# Patient Record
Sex: MALE | Race: OTHER | HISPANIC OR LATINO
[De-identification: names, ages, dates, MRNs, and addresses within clinical notes are randomized per-mention and may not be internally consistent; named-entity substitution may affect disease eponyms.]

---

## 2018-02-01 PROBLEM — Z00.00 ENCOUNTER FOR PREVENTIVE HEALTH EXAMINATION: Status: ACTIVE | Noted: 2018-02-01

## 2018-02-07 ENCOUNTER — APPOINTMENT (OUTPATIENT)
Dept: ORTHOPEDIC SURGERY | Facility: CLINIC | Age: 38
End: 2018-02-07
Payer: COMMERCIAL

## 2018-02-07 VITALS — WEIGHT: 200 LBS | HEIGHT: 69 IN | BODY MASS INDEX: 29.62 KG/M2

## 2018-02-07 DIAGNOSIS — Z86.79 PERSONAL HISTORY OF OTHER DISEASES OF THE CIRCULATORY SYSTEM: ICD-10-CM

## 2018-02-07 DIAGNOSIS — M25.561 PAIN IN RIGHT KNEE: ICD-10-CM

## 2018-02-07 DIAGNOSIS — M17.10 UNILATERAL PRIMARY OSTEOARTHRITIS, UNSPECIFIED KNEE: ICD-10-CM

## 2018-02-07 DIAGNOSIS — Z87.891 PERSONAL HISTORY OF NICOTINE DEPENDENCE: ICD-10-CM

## 2018-02-07 DIAGNOSIS — C80.1 MALIGNANT (PRIMARY) NEOPLASM, UNSPECIFIED: ICD-10-CM

## 2018-02-07 DIAGNOSIS — M25.562 PAIN IN RIGHT KNEE: ICD-10-CM

## 2018-02-07 DIAGNOSIS — Z80.9 FAMILY HISTORY OF MALIGNANT NEOPLASM, UNSPECIFIED: ICD-10-CM

## 2018-02-07 DIAGNOSIS — Z87.09 PERSONAL HISTORY OF OTHER DISEASES OF THE RESPIRATORY SYSTEM: ICD-10-CM

## 2018-02-07 PROCEDURE — 73560 X-RAY EXAM OF KNEE 1 OR 2: CPT | Mod: 50

## 2018-02-07 PROCEDURE — 99203 OFFICE O/P NEW LOW 30 MIN: CPT

## 2018-02-21 ENCOUNTER — TRANSCRIPTION ENCOUNTER (OUTPATIENT)
Age: 38
End: 2018-02-21

## 2018-03-05 ENCOUNTER — APPOINTMENT (OUTPATIENT)
Dept: ORTHOPEDIC SURGERY | Facility: CLINIC | Age: 38
End: 2018-03-05
Payer: COMMERCIAL

## 2018-03-05 DIAGNOSIS — M94.261 CHONDROMALACIA, RIGHT KNEE: ICD-10-CM

## 2018-03-05 DIAGNOSIS — M17.12 UNILATERAL PRIMARY OSTEOARTHRITIS, LEFT KNEE: ICD-10-CM

## 2018-03-05 DIAGNOSIS — S83.512S SPRAIN OF ANTERIOR CRUCIATE LIGAMENT OF LEFT KNEE, SEQUELA: ICD-10-CM

## 2018-03-05 PROCEDURE — 99213 OFFICE O/P EST LOW 20 MIN: CPT

## 2020-05-13 ENCOUNTER — APPOINTMENT (OUTPATIENT)
Dept: HUMAN REPRODUCTION | Facility: CLINIC | Age: 40
End: 2020-05-13

## 2020-05-21 ENCOUNTER — APPOINTMENT (OUTPATIENT)
Dept: HUMAN REPRODUCTION | Facility: CLINIC | Age: 40
End: 2020-05-21
Payer: COMMERCIAL

## 2020-05-21 PROCEDURE — 89322 SEMEN ANAL STRICT CRITERIA: CPT

## 2020-05-29 ENCOUNTER — APPOINTMENT (OUTPATIENT)
Dept: HUMAN REPRODUCTION | Facility: CLINIC | Age: 40
End: 2020-05-29

## 2020-08-18 ENCOUNTER — TRANSCRIPTION ENCOUNTER (OUTPATIENT)
Age: 40
End: 2020-08-18

## 2020-12-03 ENCOUNTER — TRANSCRIPTION ENCOUNTER (OUTPATIENT)
Age: 40
End: 2020-12-03

## 2021-05-11 ENCOUNTER — NON-APPOINTMENT (OUTPATIENT)
Age: 41
End: 2021-05-11

## 2021-05-11 ENCOUNTER — APPOINTMENT (OUTPATIENT)
Dept: INTERNAL MEDICINE | Facility: CLINIC | Age: 41
End: 2021-05-11
Payer: COMMERCIAL

## 2021-05-11 VITALS
HEART RATE: 84 BPM | WEIGHT: 187 LBS | OXYGEN SATURATION: 96 % | DIASTOLIC BLOOD PRESSURE: 84 MMHG | SYSTOLIC BLOOD PRESSURE: 131 MMHG | HEIGHT: 67 IN | TEMPERATURE: 98 F | BODY MASS INDEX: 29.35 KG/M2

## 2021-05-11 VITALS — DIASTOLIC BLOOD PRESSURE: 82 MMHG | SYSTOLIC BLOOD PRESSURE: 130 MMHG

## 2021-05-11 DIAGNOSIS — Z87.01 PERSONAL HISTORY OF PNEUMONIA (RECURRENT): ICD-10-CM

## 2021-05-11 DIAGNOSIS — Z23 ENCOUNTER FOR IMMUNIZATION: ICD-10-CM

## 2021-05-11 DIAGNOSIS — R42 DIZZINESS AND GIDDINESS: ICD-10-CM

## 2021-05-11 DIAGNOSIS — Z83.438 FAMILY HISTORY OF OTHER DISORDER OF LIPOPROTEIN METABOLISM AND OTHER LIPIDEMIA: ICD-10-CM

## 2021-05-11 DIAGNOSIS — Z78.9 OTHER SPECIFIED HEALTH STATUS: ICD-10-CM

## 2021-05-11 DIAGNOSIS — R07.89 OTHER CHEST PAIN: ICD-10-CM

## 2021-05-11 DIAGNOSIS — Z82.49 FAMILY HISTORY OF ISCHEMIC HEART DISEASE AND OTHER DISEASES OF THE CIRCULATORY SYSTEM: ICD-10-CM

## 2021-05-11 PROCEDURE — 93000 ELECTROCARDIOGRAM COMPLETE: CPT

## 2021-05-11 PROCEDURE — 99395 PREV VISIT EST AGE 18-39: CPT | Mod: 25

## 2021-05-11 PROCEDURE — 99072 ADDL SUPL MATRL&STAF TM PHE: CPT

## 2021-05-11 RX ORDER — CHLORHEXIDINE GLUCONATE 4 %
LIQUID (ML) TOPICAL
Qty: 90 | Refills: 1 | Status: ACTIVE | COMMUNITY
Start: 2021-05-11

## 2021-05-11 NOTE — DATA REVIEWED
[FreeTextEntry1] : EKG - NSR, R - 70, axis - 15,  no STTW abnormality.  Prior EKG not available for comparison.

## 2021-05-11 NOTE — ASSESSMENT
[FreeTextEntry1] : Pt was reminded to have routine eye exam and dental care.  I will check PSA level, but pt advised to start screening colonoscopy at age 46, unless he has any symptoms.  I gave him Rx to check routine labs.

## 2021-05-11 NOTE — HISTORY OF PRESENT ILLNESS
[FreeTextEntry1] : Patient presented for the first time for transition of care, she's looking for a new PCP and requesting a PE.   Last PE and labs 11/2019. [de-identified] : Pt c/o balance issues, especially at work.  He has similar issues about 3 years ago, and it got better.  He was better, but the symptoms came back again.  He noticed it over the past few months.  He has it mostly when he's standing still, and he turns his head and feel wobbly, but did not fall.  The whole episode last only a few seconds.  He's able to catch himself and not fall.  No nausea, HA, change of vision, tinnitus.  He never gets them at night or when he's driving.\par \par A few years, he had MRI of brain and was told it was ok.  Then pt saw neurologist, and exam was Ok.  Pt was told to have more salt in his diet, especially with his diet. \par \par Pt has nasal congestion and sneezing for a couple of months, usually in the spring.  He doesn't take meds for it.  R nostril is congested, with only crusty or clear discharge.\par \par He's a , and he wears a vest, and he has some pressure and pain on L lower chest on & off for a year.  It's annoying, and sort of throbbing.  It feels like he pulls a muscle, the discomfort is about 4/10.  It goes away on its own.  \par \par Pt also wanted to know if he should have cancer screening.\par \par Pt did not get flu vaccine, and did not get COVID vaccine yet.  Pt was well and did not get sick throughout the COVID pandemic.

## 2021-05-11 NOTE — HEALTH RISK ASSESSMENT
[Very Good] : ~his/her~  mood as very good [Yes] : Yes [Monthly or less (1 pt)] : Monthly or less (1 point) [1 or 2 (0 pts)] : 1 or 2 (0 points) [Never (0 pts)] : Never (0 points) [No] : In the past 12 months have you used drugs other than those required for medical reasons? No [No falls in past year] : Patient reported no falls in the past year [0] : 2) Feeling down, depressed, or hopeless: Not at all (0) [None] : None [With Family] : lives with family [# of Members in Household ___] :  household currently consist of [unfilled] member(s) [Employed] : employed [College] : College [] :  [# Of Children ___] : has [unfilled] children [Feels Safe at Home] : Feels safe at home [Fully functional (bathing, dressing, toileting, transferring, walking, feeding)] : Fully functional (bathing, dressing, toileting, transferring, walking, feeding) [Fully functional (using the telephone, shopping, preparing meals, housekeeping, doing laundry, using] : Fully functional and needs no help or supervision to perform IADLs (using the telephone, shopping, preparing meals, housekeeping, doing laundry, using transportation, managing medications and managing finances) [Smoke Detector] : smoke detector [Carbon Monoxide Detector] : carbon monoxide detector [Seat Belt] :  uses seat belt [] : No [Audit-CScore] : 1 [de-identified] : No formal exercise,  [[id number]] : 0 [EyeExamDate] : 02/21 [Change in mental status noted] : No change in mental status noted [Reports changes in hearing] : Reports no changes in hearing [Reports changes in vision] : Reports no changes in vision [Reports changes in dental health] : Reports no changes in dental health [de-identified] : dentist - 5/2021

## 2021-05-11 NOTE — PHYSICAL EXAM
[No Acute Distress] : no acute distress [Well Nourished] : well nourished [Well Developed] : well developed [Normal Sclera/Conjunctiva] : normal sclera/conjunctiva [PERRL] : pupils equal round and reactive to light [EOMI] : extraocular movements intact [Normal Outer Ear/Nose] : the outer ears and nose were normal in appearance [Normal Oropharynx] : the oropharynx was normal [Normal TMs] : both tympanic membranes were normal [Normal Nasal Mucosa] : the nasal mucosa was normal [No JVD] : no jugular venous distention [No Lymphadenopathy] : no lymphadenopathy [Supple] : supple [No Respiratory Distress] : no respiratory distress  [Clear to Auscultation] : lungs were clear to auscultation bilaterally [Normal Rate] : normal rate  [Regular Rhythm] : with a regular rhythm [Normal S1, S2] : normal S1 and S2 [No Carotid Bruits] : no carotid bruits [No Varicosities] : no varicosities [Pedal Pulses Present] : the pedal pulses are present [No Edema] : there was no peripheral edema [No Extremity Clubbing/Cyanosis] : no extremity clubbing/cyanosis [Normal Appearance] : normal in appearance [No Masses] : no palpable masses [No Nipple Discharge] : no nipple discharge [No Axillary Lymphadenopathy] : no axillary lymphadenopathy [Soft] : abdomen soft [Non Tender] : non-tender [Non-distended] : non-distended [Normal Bowel Sounds] : normal bowel sounds [Normal Supraclavicular Nodes] : no supraclavicular lymphadenopathy [Normal Axillary Nodes] : no axillary lymphadenopathy [Normal Posterior Cervical Nodes] : no posterior cervical lymphadenopathy [Normal Anterior Cervical Nodes] : no anterior cervical lymphadenopathy [No CVA Tenderness] : no CVA  tenderness [No Spinal Tenderness] : no spinal tenderness [No Joint Swelling] : no joint swelling [Grossly Normal Strength/Tone] : grossly normal strength/tone [No Rash] : no rash [Coordination Grossly Intact] : coordination grossly intact [No Focal Deficits] : no focal deficits [Normal Gait] : normal gait [Speech Grossly Normal] : speech grossly normal [Normal Affect] : the affect was normal [Alert and Oriented x3] : oriented to person, place, and time [Normal Mood] : the mood was normal [de-identified] : Overweight male in stated age,  [de-identified] : Chest wall pain not reproducible on exam, [de-identified] : Vertigo was not elicited on exam, and no nystagmus,

## 2021-05-12 LAB
25(OH)D3 SERPL-MCNC: 29.5 NG/ML
ALBUMIN SERPL ELPH-MCNC: 4.8 G/DL
ALP BLD-CCNC: 64 U/L
ALT SERPL-CCNC: 30 U/L
ANION GAP SERPL CALC-SCNC: 14 MMOL/L
APPEARANCE: CLEAR
AST SERPL-CCNC: 19 U/L
BASOPHILS # BLD AUTO: 0.03 K/UL
BASOPHILS NFR BLD AUTO: 0.5 %
BILIRUB SERPL-MCNC: 0.5 MG/DL
BILIRUBIN URINE: NEGATIVE
BLOOD URINE: NEGATIVE
BUN SERPL-MCNC: 10 MG/DL
CALCIUM SERPL-MCNC: 9.7 MG/DL
CHLORIDE SERPL-SCNC: 103 MMOL/L
CHOLEST SERPL-MCNC: 178 MG/DL
CO2 SERPL-SCNC: 25 MMOL/L
COLOR: YELLOW
CREAT SERPL-MCNC: 0.89 MG/DL
EOSINOPHIL # BLD AUTO: 0.25 K/UL
EOSINOPHIL NFR BLD AUTO: 3.9 %
ESTIMATED AVERAGE GLUCOSE: 94 MG/DL
GLUCOSE QUALITATIVE U: NEGATIVE
GLUCOSE SERPL-MCNC: 85 MG/DL
HBA1C MFR BLD HPLC: 4.9 %
HCT VFR BLD CALC: 43.1 %
HDLC SERPL-MCNC: 35 MG/DL
HGB BLD-MCNC: 14 G/DL
IMM GRANULOCYTES NFR BLD AUTO: 0.3 %
KETONES URINE: NEGATIVE
LDLC SERPL CALC-MCNC: 117 MG/DL
LEUKOCYTE ESTERASE URINE: NEGATIVE
LYMPHOCYTES # BLD AUTO: 2.16 K/UL
LYMPHOCYTES NFR BLD AUTO: 34 %
MAN DIFF?: NORMAL
MCHC RBC-ENTMCNC: 30.5 PG
MCHC RBC-ENTMCNC: 32.5 GM/DL
MCV RBC AUTO: 93.9 FL
MONOCYTES # BLD AUTO: 0.53 K/UL
MONOCYTES NFR BLD AUTO: 8.3 %
NEUTROPHILS # BLD AUTO: 3.36 K/UL
NEUTROPHILS NFR BLD AUTO: 53 %
NITRITE URINE: NEGATIVE
NONHDLC SERPL-MCNC: 143 MG/DL
PH URINE: 5.5
PLATELET # BLD AUTO: 292 K/UL
POTASSIUM SERPL-SCNC: 4.3 MMOL/L
PROT SERPL-MCNC: 7.5 G/DL
PROTEIN URINE: NORMAL
PSA SERPL-MCNC: 1.25 NG/ML
RBC # BLD: 4.59 M/UL
RBC # FLD: 11.9 %
SODIUM SERPL-SCNC: 142 MMOL/L
SPECIFIC GRAVITY URINE: 1.02
T4 FREE SERPL-MCNC: 1 NG/DL
TRIGL SERPL-MCNC: 130 MG/DL
TSH SERPL-ACNC: 2.26 UIU/ML
UROBILINOGEN URINE: NORMAL
WBC # FLD AUTO: 6.35 K/UL

## 2021-05-24 ENCOUNTER — APPOINTMENT (OUTPATIENT)
Dept: INTERNAL MEDICINE | Facility: CLINIC | Age: 41
End: 2021-05-24
Payer: COMMERCIAL

## 2021-05-24 VITALS
OXYGEN SATURATION: 97 % | HEART RATE: 82 BPM | HEIGHT: 67.5 IN | SYSTOLIC BLOOD PRESSURE: 110 MMHG | TEMPERATURE: 97.7 F | DIASTOLIC BLOOD PRESSURE: 80 MMHG | BODY MASS INDEX: 29.47 KG/M2 | WEIGHT: 190 LBS

## 2021-05-24 DIAGNOSIS — J30.9 ALLERGIC RHINITIS, UNSPECIFIED: ICD-10-CM

## 2021-05-24 DIAGNOSIS — Z80.0 FAMILY HISTORY OF MALIGNANT NEOPLASM OF DIGESTIVE ORGANS: ICD-10-CM

## 2021-05-24 DIAGNOSIS — R42 DIZZINESS AND GIDDINESS: ICD-10-CM

## 2021-05-24 PROCEDURE — 99215 OFFICE O/P EST HI 40 MIN: CPT

## 2021-05-24 RX ORDER — FLUTICASONE PROPIONATE 50 UG/1
50 SPRAY, METERED NASAL DAILY
Qty: 1 | Refills: 0 | Status: ACTIVE | COMMUNITY
Start: 2021-05-24 | End: 1900-01-01

## 2021-05-24 RX ORDER — MECLIZINE HYDROCHLORIDE 25 MG/1
25 TABLET ORAL 3 TIMES DAILY
Qty: 30 | Refills: 5 | Status: ACTIVE | COMMUNITY
Start: 2021-05-24 | End: 1900-01-01

## 2021-05-24 NOTE — PHYSICAL EXAM
[No Acute Distress] : no acute distress [Well Developed] : well developed [Well Nourished] : well nourished [Normal Sclera/Conjunctiva] : normal sclera/conjunctiva [EOMI] : extraocular movements intact [No Respiratory Distress] : no respiratory distress  [Clear to Auscultation] : lungs were clear to auscultation bilaterally [Normal S1, S2] : normal S1 and S2 [No Murmur] : no murmur heard [No Edema] : there was no peripheral edema [No Extremity Clubbing/Cyanosis] : no extremity clubbing/cyanosis [Soft] : abdomen soft [Non-distended] : non-distended [Normal Bowel Sounds] : normal bowel sounds

## 2021-05-24 NOTE — HEALTH RISK ASSESSMENT
[No falls in past year] : Patient reported no falls in the past year [0] : 2) Feeling down, depressed, or hopeless: Not at all (0) [[id number]] : 0

## 2021-05-24 NOTE — HISTORY OF PRESENT ILLNESS
[FreeTextEntry8] : Mr. KYA BA is a 40 year old man with pmhx of allergic rhinitis who presents to establish care. He recently saw Dr. Lepe with unremarkable labwork and ekg. He endorses recently his mother passed away from gastric cancer and his uncle from colon cancer. He has increased concern about this and wanted to discuss screening options. He endorses balance problem, no dizziness nor vertigo. He had this in the past. \par

## 2021-06-09 ENCOUNTER — APPOINTMENT (OUTPATIENT)
Dept: GASTROENTEROLOGY | Facility: CLINIC | Age: 41
End: 2021-06-09
Payer: COMMERCIAL

## 2021-06-09 VITALS
BODY MASS INDEX: 29.78 KG/M2 | HEIGHT: 67.5 IN | OXYGEN SATURATION: 96 % | DIASTOLIC BLOOD PRESSURE: 80 MMHG | TEMPERATURE: 97.6 F | HEART RATE: 84 BPM | WEIGHT: 192 LBS | SYSTOLIC BLOOD PRESSURE: 116 MMHG

## 2021-06-09 DIAGNOSIS — K62.5 HEMORRHAGE OF ANUS AND RECTUM: ICD-10-CM

## 2021-06-09 DIAGNOSIS — R10.13 EPIGASTRIC PAIN: ICD-10-CM

## 2021-06-09 PROCEDURE — 99204 OFFICE O/P NEW MOD 45 MIN: CPT

## 2021-06-09 NOTE — ASSESSMENT
[FreeTextEntry1] : 1. dyspepsia , family h/o gastric cancer, recommend egd to r/o gastritis, etc.\par \par Discussed risks including but not limited to bleeding,infection,drug reaction, perforation,missed lesion,benefits and alternatives of colonoscopy/egd with patient  including no\par treatment and patient consents to procedure.\par \par plan egd to be scheduled\par \par 2. h/o brbpr, given age close to 41 recommend colonoscopy to r/o hemorrhoids,polyp etc.\par \par plan colonoscopy to be scheduled\par \par 3. left sided rib pain, \par \par plan management per pcp\par

## 2021-06-09 NOTE — PHYSICAL EXAM
[General Appearance - Alert] : alert [General Appearance - In No Acute Distress] : in no acute distress [General Appearance - Well Nourished] : well nourished [General Appearance - Well Developed] : well developed [Sclera] : the sclera and conjunctiva were normal [Hearing Threshold Finger Rub Not [place]] : hearing was normal [Respiration, Rhythm And Depth] : normal respiratory rhythm and effort [Auscultation Breath Sounds / Voice Sounds] : lungs were clear to auscultation bilaterally [Heart Sounds] : normal S1 and S2 [Bowel Sounds] : normal bowel sounds [Abdomen Soft] : soft [Abdomen Tenderness] : non-tender [Abnormal Walk] : normal gait [Nail Clubbing] : no clubbing  or cyanosis of the fingernails [] : no rash [Skin Lesions] : no skin lesions [No Focal Deficits] : no focal deficits [Oriented To Time, Place, And Person] : oriented to person, place, and time [No CVA Tenderness] : no ~M costovertebral angle tenderness

## 2021-06-09 NOTE — REVIEW OF SYSTEMS
[As Noted in HPI] : as noted in HPI [Fever] : no fever [Chills] : no chills [Eyesight Problems] : no eyesight problems [Discharge From Eyes] : no purulent discharge from the eyes [Nosebleeds] : no nosebleeds [Chest Pain] : no chest pain [Palpitations] : no palpitations [Cough] : no cough [SOB on Exertion] : no shortness of breath during exertion [Hesitancy] : no urinary hesitancy [Itching] : no itching [Change In A Mole] : no change in a mole [Anxiety] : no anxiety [Depression] : no depression [Muscle Weakness] : no muscle weakness [Swollen Glands] : no swollen glands [Swollen Glands In The Neck] : no swollen glands in the neck

## 2021-06-09 NOTE — HISTORY OF PRESENT ILLNESS
[FreeTextEntry1] : 41 yo male referred for colonoscopy, patient's maother  gastric cancer, age 67 and uncle with colon cancer.\par Patient reports normal bms, no brbpr,  h/o BRBPR on tissue paper, stool is brown. occassional dyspepsis with certain foods. tomato sauce,not weekly, Symptoms intermittent for years. no weight loss. no constipation,\par \par never had egd/colonoscopy.\par \par Patient reports left sided rib pain,worse with movement and touch for months, takes nsaids with relief temporarily

## 2021-06-16 ENCOUNTER — APPOINTMENT (OUTPATIENT)
Dept: PULMONOLOGY | Facility: CLINIC | Age: 41
End: 2021-06-16
Payer: COMMERCIAL

## 2021-06-16 VITALS
BODY MASS INDEX: 30.45 KG/M2 | DIASTOLIC BLOOD PRESSURE: 84 MMHG | HEART RATE: 83 BPM | OXYGEN SATURATION: 97 % | TEMPERATURE: 97.2 F | SYSTOLIC BLOOD PRESSURE: 133 MMHG | HEIGHT: 67 IN | WEIGHT: 194 LBS

## 2021-06-16 DIAGNOSIS — R06.83 SNORING: ICD-10-CM

## 2021-06-16 PROCEDURE — 99204 OFFICE O/P NEW MOD 45 MIN: CPT

## 2021-06-16 NOTE — ASSESSMENT
[FreeTextEntry1] : This is a 40 year old male referred by Dr. Heck for evaluation of possible sleep apnea. The patient has multiple signs and symptoms of sleep-disordered breathing include loud snoring, nonrestorative sleep, and EDS. He was referred to the [facility] for a diagnostic HSAT. The ramifications of MITCH and its potential therapeutic modalities were discussed with the patient. The patient was cautioned on the importance of avoiding drowsy driving. He will follow up with us after the HSAT.\par

## 2021-06-16 NOTE — REVIEW OF SYSTEMS
[EDS: ESS=____] : daytime somnolence: ESS=[unfilled] [Snoring] : snoring [Postnasal Drip] : no postnasal drip [Witnessed Apneas] : no witnessed apnea [A.M. Dry Mouth] : no a.m. dry mouth [A.M. Headache] : no headache present upon awakening [Difficulty Initiating Sleep] : no difficulty falling asleep [Difficulty Maintaining Sleep] : no difficulty maintaining sleep [Lower Extremity Discomfort] : no lower extremity discomfort [Irresistible urge to move legs] : no irresistible urge to move legs because of lower extremity discomfort [Late day/ Evening symptoms] : no late day/evening symptoms [Sleep Disturbances due to LE symptoms] : ~T no sleep disturbances due to lower extremity symptoms [Unusual Sleep Behavior] : no unusual sleep behavior [Hypersomnolence] : not sleeping much more than usual [Cataplexy] :  no cataplexy [Hypnogogic Hallucinations] : no hypnogogic hallucinations [Hypnopompic Hallucinations] : no hypnopompic hallucinations [Sleep Paralysis] : no sleep paralysis [Negative] : Psychiatric

## 2021-06-16 NOTE — HISTORY OF PRESENT ILLNESS
[Snoring] : snoring [Witnessed Apneas] : no witnessed sleep apnea [Frequent Nocturnal Awakening] : no nocturnal awakening [Unintentional Sleep while Active] : no unintentional sleep while active [Unintentional Sleep While Inactive] : no unintentional sleep while inactive [Awakes Unrefreshed] : awakening unrefreshed [Awakes with Headache] : no headache upon awakening [Awakening With Dry Mouth] : no dry mouth upon awakening [Recent  Weight Gain] : no recent weight gain [Daytime Somnolence] : daytime somnolence [DIS] : no DIS [DMS] : no DMS [To Bed: ___] : ~he/she~ goes to bed at [unfilled] [Arises: ___] : arises at [unfilled] [Nocturnal Awakenings: ___] : ~he/she~ typically has [unfilled] nocturnal awakenings [ESS] : 5

## 2021-06-16 NOTE — PHYSICAL EXAM
[General Appearance - Well Developed] : well developed [Normal Appearance] : normal appearance [General Appearance - Well Nourished] : well nourished [Enlarged Base of the Tongue] : enlargement of the base of the tongue [IV] : IV [Neck Appearance] : the appearance of the neck was normal [Neck Cervical Mass (___cm)] : no neck mass was observed [Apical Impulse] : the apical impulse was normal [Heart Rate And Rhythm] : heart rate was normal and rhythm regular [Heart Sounds] : normal S1 and S2 [] : no respiratory distress [Respiration, Rhythm And Depth] : normal respiratory rhythm and effort [Auscultation Breath Sounds / Voice Sounds] : lungs were clear to auscultation bilaterally [Nail Clubbing] : no clubbing of the fingernails [Cyanosis, Localized] : no localized cyanosis [No Focal Deficits] : no focal deficits [Oriented To Time, Place, And Person] : oriented to person, place, and time [Impaired Insight] : insight and judgment were intact [Affect] : the affect was normal [FreeTextEntry2] : no edema

## 2021-07-15 ENCOUNTER — APPOINTMENT (OUTPATIENT)
Dept: SLEEP CENTER | Facility: CLINIC | Age: 41
End: 2021-07-15
Payer: COMMERCIAL

## 2021-07-15 ENCOUNTER — OUTPATIENT (OUTPATIENT)
Dept: OUTPATIENT SERVICES | Facility: HOSPITAL | Age: 41
LOS: 1 days | End: 2021-07-15
Payer: COMMERCIAL

## 2021-07-15 PROCEDURE — 95806 SLEEP STUDY UNATT&RESP EFFT: CPT | Mod: 26

## 2021-07-15 PROCEDURE — 95806 SLEEP STUDY UNATT&RESP EFFT: CPT

## 2021-07-16 NOTE — REVIEW OF SYSTEMS
Valtrex Counseling: I discussed with the patient the risks of valacyclovir including but not limited to kidney damage, nausea, vomiting and severe allergy.  The patient understands that if the infection seems to be worsening or is not improving, they are to call. [Nasal Discharge] : nasal discharge [Heartburn] : heartburn [Unsteady Walk] : ataxia [Negative] : Heme/Lymph [Fever] : no fever [Chills] : no chills [Recent Change In Weight] : ~T no recent weight change [Chest Pain] : no chest pain [Palpitations] : no palpitations [Lower Ext Edema] : no lower extremity edema [Shortness Of Breath] : no shortness of breath [Wheezing] : no wheezing [Cough] : no cough [Dyspnea on Exertion] : not dyspnea on exertion [Abdominal Pain] : no abdominal pain [Nausea] : no nausea [Constipation] : no constipation [Diarrhea] : no diarrhea [Vomiting] : no vomiting [Melena] : no melena [Dysuria] : no dysuria [Incontinence] : no incontinence [Nocturia] : no nocturia [Hematuria] : no hematuria [Joint Pain] : no joint pain [Joint Stiffness] : no joint stiffness [Muscle Pain] : no muscle pain [Back Pain] : no back pain [Joint Swelling] : no joint swelling [Itching] : no itching [Mole Changes] : no mole changes [Skin Rash] : no skin rash [Headache] : no headache [Dizziness] : no dizziness [Fainting] : no fainting [Memory Loss] : no memory loss [Insomnia] : no insomnia [Anxiety] : no anxiety [Depression] : no depression [Easy Bruising] : no easy bruising [Swollen Glands] : no swollen glands [FreeTextEntry3] : wears corrective lens, [FreeTextEntry4] : as in HPI,  [FreeTextEntry7] : Has occ heartburn, depends on food, he has it about a few times a year, it goes away on its own, and lasts only a few minutes,  [de-identified] : as in HPI,

## 2021-07-21 DIAGNOSIS — G47.33 OBSTRUCTIVE SLEEP APNEA (ADULT) (PEDIATRIC): ICD-10-CM

## 2021-07-27 ENCOUNTER — NON-APPOINTMENT (OUTPATIENT)
Age: 41
End: 2021-07-27

## 2021-08-07 ENCOUNTER — APPOINTMENT (OUTPATIENT)
Dept: DISASTER EMERGENCY | Facility: OTHER | Age: 41
End: 2021-08-07
Payer: COMMERCIAL

## 2021-08-07 PROCEDURE — 0001A: CPT

## 2021-08-13 DIAGNOSIS — Z01.818 ENCOUNTER FOR OTHER PREPROCEDURAL EXAMINATION: ICD-10-CM

## 2021-08-14 ENCOUNTER — APPOINTMENT (OUTPATIENT)
Dept: DISASTER EMERGENCY | Facility: CLINIC | Age: 41
End: 2021-08-14

## 2021-08-15 LAB — SARS-COV-2 N GENE NPH QL NAA+PROBE: NOT DETECTED

## 2021-08-17 ENCOUNTER — APPOINTMENT (OUTPATIENT)
Dept: GASTROENTEROLOGY | Facility: CLINIC | Age: 41
End: 2021-08-17
Payer: COMMERCIAL

## 2021-08-17 ENCOUNTER — LABORATORY RESULT (OUTPATIENT)
Age: 41
End: 2021-08-17

## 2021-08-17 PROCEDURE — 45378 DIAGNOSTIC COLONOSCOPY: CPT

## 2021-08-17 PROCEDURE — 43235 EGD DIAGNOSTIC BRUSH WASH: CPT

## 2021-08-20 ENCOUNTER — TRANSCRIPTION ENCOUNTER (OUTPATIENT)
Age: 41
End: 2021-08-20

## 2021-09-04 ENCOUNTER — APPOINTMENT (OUTPATIENT)
Dept: DISASTER EMERGENCY | Facility: OTHER | Age: 41
End: 2021-09-04
Payer: COMMERCIAL

## 2021-09-04 ENCOUNTER — APPOINTMENT (OUTPATIENT)
Dept: DISASTER EMERGENCY | Facility: OTHER | Age: 41
End: 2021-09-04

## 2021-09-04 PROCEDURE — 0002A: CPT

## 2021-09-21 ENCOUNTER — APPOINTMENT (OUTPATIENT)
Dept: GASTROENTEROLOGY | Facility: CLINIC | Age: 41
End: 2021-09-21

## 2022-06-02 ENCOUNTER — APPOINTMENT (OUTPATIENT)
Dept: INTERNAL MEDICINE | Facility: CLINIC | Age: 42
End: 2022-06-02
Payer: COMMERCIAL

## 2022-06-02 ENCOUNTER — RESULT REVIEW (OUTPATIENT)
Age: 42
End: 2022-06-02

## 2022-06-02 VITALS
TEMPERATURE: 97.8 F | HEIGHT: 66.5 IN | SYSTOLIC BLOOD PRESSURE: 112 MMHG | OXYGEN SATURATION: 98 % | HEART RATE: 89 BPM | DIASTOLIC BLOOD PRESSURE: 68 MMHG | WEIGHT: 180 LBS | BODY MASS INDEX: 28.59 KG/M2

## 2022-06-02 DIAGNOSIS — E78.5 HYPERLIPIDEMIA, UNSPECIFIED: ICD-10-CM

## 2022-06-02 DIAGNOSIS — R53.83 OTHER FATIGUE: ICD-10-CM

## 2022-06-02 DIAGNOSIS — M25.561 PAIN IN RIGHT KNEE: ICD-10-CM

## 2022-06-02 DIAGNOSIS — M25.551 PAIN IN RIGHT HIP: ICD-10-CM

## 2022-06-02 DIAGNOSIS — Z00.00 ENCOUNTER FOR GENERAL ADULT MEDICAL EXAMINATION W/OUT ABNORMAL FINDINGS: ICD-10-CM

## 2022-06-02 DIAGNOSIS — E66.3 OVERWEIGHT: ICD-10-CM

## 2022-06-02 DIAGNOSIS — Z12.83 ENCOUNTER FOR SCREENING FOR MALIGNANT NEOPLASM OF SKIN: ICD-10-CM

## 2022-06-02 PROCEDURE — 99396 PREV VISIT EST AGE 40-64: CPT | Mod: 25

## 2022-06-02 PROCEDURE — G0444 DEPRESSION SCREEN ANNUAL: CPT | Mod: 59

## 2022-06-03 PROBLEM — M25.551 HIP PAIN, RIGHT: Status: ACTIVE | Noted: 2022-06-02

## 2022-06-03 PROBLEM — M25.561 RIGHT KNEE PAIN: Status: ACTIVE | Noted: 2022-06-02

## 2022-06-03 LAB
25(OH)D3 SERPL-MCNC: 25.3 NG/ML
ALBUMIN SERPL ELPH-MCNC: 4.9 G/DL
ALP BLD-CCNC: 63 U/L
ALT SERPL-CCNC: 28 U/L
ANION GAP SERPL CALC-SCNC: 12 MMOL/L
APPEARANCE: CLEAR
AST SERPL-CCNC: 19 U/L
BACTERIA: NEGATIVE
BASOPHILS # BLD AUTO: 0.03 K/UL
BASOPHILS NFR BLD AUTO: 0.6 %
BILIRUB SERPL-MCNC: 0.6 MG/DL
BILIRUBIN URINE: NEGATIVE
BLOOD URINE: NEGATIVE
BUN SERPL-MCNC: 11 MG/DL
CALCIUM SERPL-MCNC: 10 MG/DL
CHLORIDE SERPL-SCNC: 103 MMOL/L
CHOLEST SERPL-MCNC: 177 MG/DL
CO2 SERPL-SCNC: 25 MMOL/L
COLOR: YELLOW
CREAT SERPL-MCNC: 0.79 MG/DL
EGFR: 115 ML/MIN/1.73M2
EOSINOPHIL # BLD AUTO: 0.17 K/UL
EOSINOPHIL NFR BLD AUTO: 3.4 %
GLUCOSE QUALITATIVE U: NEGATIVE
GLUCOSE SERPL-MCNC: 91 MG/DL
HBV SURFACE AB SERPL IA-ACNC: 67.7 MIU/ML
HBV SURFACE AG SER QL: NONREACTIVE
HCT VFR BLD CALC: 44.4 %
HCV AB SER QL: NONREACTIVE
HCV S/CO RATIO: 0.1 S/CO
HDLC SERPL-MCNC: 39 MG/DL
HGB BLD-MCNC: 14.4 G/DL
HIV1+2 AB SPEC QL IA.RAPID: NONREACTIVE
HYALINE CASTS: 0 /LPF
IMM GRANULOCYTES NFR BLD AUTO: 0 %
KETONES URINE: NEGATIVE
LDLC SERPL CALC-MCNC: 122 MG/DL
LEUKOCYTE ESTERASE URINE: NEGATIVE
LYMPHOCYTES # BLD AUTO: 1.66 K/UL
LYMPHOCYTES NFR BLD AUTO: 33.1 %
MAN DIFF?: NORMAL
MCHC RBC-ENTMCNC: 31 PG
MCHC RBC-ENTMCNC: 32.4 GM/DL
MCV RBC AUTO: 95.5 FL
MICROSCOPIC-UA: NORMAL
MONOCYTES # BLD AUTO: 0.49 K/UL
MONOCYTES NFR BLD AUTO: 9.8 %
NEUTROPHILS # BLD AUTO: 2.67 K/UL
NEUTROPHILS NFR BLD AUTO: 53.1 %
NITRITE URINE: NEGATIVE
NONHDLC SERPL-MCNC: 138 MG/DL
PH URINE: 6.5
PLATELET # BLD AUTO: 300 K/UL
POTASSIUM SERPL-SCNC: 4.9 MMOL/L
PROT SERPL-MCNC: 7.4 G/DL
PROTEIN URINE: NORMAL
RBC # BLD: 4.65 M/UL
RBC # FLD: 11.9 %
RED BLOOD CELLS URINE: 2 /HPF
SODIUM SERPL-SCNC: 140 MMOL/L
SPECIFIC GRAVITY URINE: 1.03
SQUAMOUS EPITHELIAL CELLS: 0 /HPF
TRIGL SERPL-MCNC: 80 MG/DL
UROBILINOGEN URINE: NORMAL
VIT B12 SERPL-MCNC: 350 PG/ML
WBC # FLD AUTO: 5.02 K/UL
WHITE BLOOD CELLS URINE: 0 /HPF

## 2022-06-03 NOTE — REVIEW OF SYSTEMS
[Nasal Discharge] : nasal discharge [Heartburn] : heartburn [Unsteady Walk] : ataxia [Negative] : Heme/Lymph [Fever] : no fever [Chills] : no chills [Recent Change In Weight] : ~T no recent weight change [Chest Pain] : no chest pain [Palpitations] : no palpitations [Lower Ext Edema] : no lower extremity edema [Shortness Of Breath] : no shortness of breath [Wheezing] : no wheezing [Cough] : no cough [Dyspnea on Exertion] : not dyspnea on exertion [Abdominal Pain] : no abdominal pain [Nausea] : no nausea [Constipation] : no constipation [Diarrhea] : no diarrhea [Vomiting] : no vomiting [Melena] : no melena [Dysuria] : no dysuria [Incontinence] : no incontinence [Nocturia] : no nocturia [Hematuria] : no hematuria [Joint Pain] : no joint pain [Joint Stiffness] : no joint stiffness [Muscle Pain] : no muscle pain [Back Pain] : no back pain [Joint Swelling] : no joint swelling [Itching] : no itching [Mole Changes] : no mole changes [Skin Rash] : no skin rash [Headache] : no headache [Dizziness] : no dizziness [Fainting] : no fainting [Memory Loss] : no memory loss [Insomnia] : no insomnia [Anxiety] : no anxiety [Depression] : no depression [Easy Bruising] : no easy bruising [Swollen Glands] : no swollen glands [FreeTextEntry3] : wears corrective lens, [FreeTextEntry4] : as in HPI,  [FreeTextEntry7] : Has occ heartburn, depends on food, he has it about a few times a year, it goes away on its own, and lasts only a few minutes,  [de-identified] : as in HPI,

## 2022-06-03 NOTE — HEALTH RISK ASSESSMENT
[Very Good] : ~his/her~  mood as very good [Yes] : Yes [Monthly or less (1 pt)] : Monthly or less (1 point) [1 or 2 (0 pts)] : 1 or 2 (0 points) [Never (0 pts)] : Never (0 points) [No] : In the past 12 months have you used drugs other than those required for medical reasons? No [No falls in past year] : Patient reported no falls in the past year [0] : 2) Feeling down, depressed, or hopeless: Not at all (0) [None] : None [With Family] : lives with family [# of Members in Household ___] :  household currently consist of [unfilled] member(s) [Employed] : employed [College] : College [] :  [# Of Children ___] : has [unfilled] children [Feels Safe at Home] : Feels safe at home [Fully functional (bathing, dressing, toileting, transferring, walking, feeding)] : Fully functional (bathing, dressing, toileting, transferring, walking, feeding) [Fully functional (using the telephone, shopping, preparing meals, housekeeping, doing laundry, using] : Fully functional and needs no help or supervision to perform IADLs (using the telephone, shopping, preparing meals, housekeeping, doing laundry, using transportation, managing medications and managing finances) [Smoke Detector] : smoke detector [Carbon Monoxide Detector] : carbon monoxide detector [Seat Belt] :  uses seat belt [Patient/Caregiver not ready to engage] : , patient/caregiver not ready to engage [PHQ-2 Negative - No further assessment needed] : PHQ-2 Negative - No further assessment needed [Audit-CScore] : 1 [de-identified] : No formal exercise,  [[id number]] : 0 [EyeExamDate] : 02/21 [Change in mental status noted] : No change in mental status noted [Reports changes in hearing] : Reports no changes in hearing [Reports changes in vision] : Reports no changes in vision [Reports changes in dental health] : Reports no changes in dental health [ColonoscopyDate] : 08/21 [de-identified] : dentist - 5/2021

## 2022-06-03 NOTE — HISTORY OF PRESENT ILLNESS
[FreeTextEntry1] : Physical [de-identified] : Mr. KYA BA is a 41 year old man with pmhx of allergic rhinitis, multiple benign nevi who presents for annual physical. He works as a . He complains of intermittent knee pain for the past year. 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 His pain is suprapatellar. Likely tendinitis.

## 2022-06-03 NOTE — PHYSICAL EXAM
[No Acute Distress] : no acute distress [Well Nourished] : well nourished [Well Developed] : well developed [Normal Sclera/Conjunctiva] : normal sclera/conjunctiva [PERRL] : pupils equal round and reactive to light [EOMI] : extraocular movements intact [Normal Outer Ear/Nose] : the outer ears and nose were normal in appearance [Normal Oropharynx] : the oropharynx was normal [Normal TMs] : both tympanic membranes were normal [Normal Nasal Mucosa] : the nasal mucosa was normal [No JVD] : no jugular venous distention [No Lymphadenopathy] : no lymphadenopathy [Supple] : supple [No Respiratory Distress] : no respiratory distress  [Clear to Auscultation] : lungs were clear to auscultation bilaterally [Normal Rate] : normal rate  [Regular Rhythm] : with a regular rhythm [Normal S1, S2] : normal S1 and S2 [No Carotid Bruits] : no carotid bruits [No Varicosities] : no varicosities [Pedal Pulses Present] : the pedal pulses are present [No Edema] : there was no peripheral edema [No Extremity Clubbing/Cyanosis] : no extremity clubbing/cyanosis [Normal Appearance] : normal in appearance [No Masses] : no palpable masses [No Nipple Discharge] : no nipple discharge [No Axillary Lymphadenopathy] : no axillary lymphadenopathy [Soft] : abdomen soft [Non Tender] : non-tender [Non-distended] : non-distended [Normal Bowel Sounds] : normal bowel sounds [Normal Supraclavicular Nodes] : no supraclavicular lymphadenopathy [Normal Axillary Nodes] : no axillary lymphadenopathy [Normal Posterior Cervical Nodes] : no posterior cervical lymphadenopathy [Normal Anterior Cervical Nodes] : no anterior cervical lymphadenopathy [No CVA Tenderness] : no CVA  tenderness [No Spinal Tenderness] : no spinal tenderness [No Joint Swelling] : no joint swelling [Grossly Normal Strength/Tone] : grossly normal strength/tone [No Rash] : no rash [Coordination Grossly Intact] : coordination grossly intact [No Focal Deficits] : no focal deficits [Normal Gait] : normal gait [Speech Grossly Normal] : speech grossly normal [Normal Affect] : the affect was normal [Alert and Oriented x3] : oriented to person, place, and time [Normal Mood] : the mood was normal [Normal Insight/Judgement] : insight and judgment were intact [Comprehensive Foot Exam Normal] : Right and left foot were examined and both feet are normal. No ulcers in either foot. Toes are normal and with full ROM.  Normal tactile sensation with monofilament testing throughout both feet [de-identified] : Overweight male in stated age,  [de-identified] : Chest wall pain not reproducible on exam, [de-identified] : Vertigo was not elicited on exam, and no nystagmus,

## 2022-06-03 NOTE — ASSESSMENT
[FreeTextEntry1] : 1) HM - Continue healthy meals and snacks, and  physical activity as tolerated for weight reduction/maintenance. UTD with TDAP and COVID vaccine. Screening for vital illness due to exposures at work. Lab ordered as below  Orders and referral provided as below. Counseled on ABCDE of melanoma and testicular cancer self examination. Baseline PSA level 1.25 ng/mL. \par \par All patient questions answered today and understood by patient. Patient to follow up if new symptoms, questions, renewals or health concerns.

## 2022-07-08 ENCOUNTER — OUTPATIENT (OUTPATIENT)
Dept: OUTPATIENT SERVICES | Facility: HOSPITAL | Age: 42
LOS: 1 days | End: 2022-07-08
Payer: COMMERCIAL

## 2022-07-08 ENCOUNTER — APPOINTMENT (OUTPATIENT)
Dept: RADIOLOGY | Facility: IMAGING CENTER | Age: 42
End: 2022-07-08

## 2022-07-08 DIAGNOSIS — M25.561 PAIN IN RIGHT KNEE: ICD-10-CM

## 2022-07-08 PROCEDURE — 73562 X-RAY EXAM OF KNEE 3: CPT | Mod: 26,RT

## 2022-07-08 PROCEDURE — 73562 X-RAY EXAM OF KNEE 3: CPT

## 2022-07-18 ENCOUNTER — APPOINTMENT (OUTPATIENT)
Dept: INTERNAL MEDICINE | Facility: CLINIC | Age: 42
End: 2022-07-18

## 2022-07-18 VITALS
SYSTOLIC BLOOD PRESSURE: 120 MMHG | DIASTOLIC BLOOD PRESSURE: 70 MMHG | WEIGHT: 185 LBS | HEART RATE: 78 BPM | TEMPERATURE: 97.1 F | HEIGHT: 66.5 IN | BODY MASS INDEX: 29.38 KG/M2 | OXYGEN SATURATION: 98 %

## 2022-07-18 DIAGNOSIS — M62.838 OTHER MUSCLE SPASM: ICD-10-CM

## 2022-07-18 DIAGNOSIS — Z77.21 CONTACT WITH AND (SUSPECTED) EXPOSURE TO POTENTIALLY HAZARDOUS BODY FLUIDS: ICD-10-CM

## 2022-07-18 PROCEDURE — 99214 OFFICE O/P EST MOD 30 MIN: CPT | Mod: 25

## 2022-07-18 RX ORDER — CYCLOBENZAPRINE HYDROCHLORIDE 5 MG/1
5 TABLET, FILM COATED ORAL
Qty: 10 | Refills: 0 | Status: ACTIVE | COMMUNITY
Start: 2022-07-18 | End: 1900-01-01

## 2022-07-18 RX ORDER — NAPROXEN 500 MG/1
500 TABLET ORAL
Qty: 1 | Refills: 0 | Status: ACTIVE | COMMUNITY
Start: 2022-07-18 | End: 1900-01-01

## 2022-07-18 NOTE — HISTORY OF PRESENT ILLNESS
[FreeTextEntry8] : Mr. KYA BA is a 41 year old man with pmhx of allergic rhinitis, multiple benign nevi who presents for a follow up. \par \par On july 8th he had incident at work as , he felt injury to his back. He had Right sided lumbar pain with radiation down the back of the leg. He went to urgent care, was given tylenol. His pain has not been improving. 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 He will need MRI to further investigate.

## 2022-07-19 LAB
HBV SURFACE AB SER QL: REACTIVE
HBV SURFACE AG SER QL: NONREACTIVE
HCV AB SER QL: NONREACTIVE
HCV S/CO RATIO: 0.08 S/CO
HIV1+2 AB SPEC QL IA.RAPID: NONREACTIVE

## 2022-08-10 ENCOUNTER — APPOINTMENT (OUTPATIENT)
Dept: ORTHOPEDIC SURGERY | Facility: CLINIC | Age: 42
End: 2022-08-10

## 2022-08-10 VITALS
DIASTOLIC BLOOD PRESSURE: 84 MMHG | WEIGHT: 185 LBS | BODY MASS INDEX: 29.38 KG/M2 | HEART RATE: 73 BPM | HEIGHT: 66.5 IN | SYSTOLIC BLOOD PRESSURE: 125 MMHG

## 2022-08-10 DIAGNOSIS — S39.012A STRAIN OF MUSCLE, FASCIA AND TENDON OF LOWER BACK, INITIAL ENCOUNTER: ICD-10-CM

## 2022-08-10 DIAGNOSIS — M54.16 RADICULOPATHY, LUMBAR REGION: ICD-10-CM

## 2022-08-10 PROCEDURE — 99204 OFFICE O/P NEW MOD 45 MIN: CPT

## 2022-08-10 PROCEDURE — 72100 X-RAY EXAM L-S SPINE 2/3 VWS: CPT

## 2022-08-10 PROCEDURE — 99072 ADDL SUPL MATRL&STAF TM PHE: CPT

## 2022-08-10 RX ORDER — IBUPROFEN 800 MG/1
800 TABLET, FILM COATED ORAL
Qty: 90 | Refills: 0 | Status: ACTIVE | COMMUNITY
Start: 2022-08-10 | End: 1900-01-01

## 2022-08-10 NOTE — HISTORY OF PRESENT ILLNESS
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  He has not pain further down the leg nor has he had left leg symptoms.  He has not had leg pain in the past.  He has not had associated neurologic symptoms of numbness, paresthesias or weakness.  There is no Valsalva effect.  He has not had night pain.  The pain is worse standing for prolonged period of time and slightly worse sitting and driving.  The pain is no worse walking.  His primary care physician started him on Flexeril and he has been taking Naprosyn but only once a day.  His past medical history and review of systems are negative with the exception of a penicillin allergy. [Pain Location] : pain [Worsening] : worsening [5] : a maximum pain level of 5/10 [Sitting] : sitting [Standing] : standing

## 2022-08-10 NOTE — PHYSICAL EXAM
[de-identified] : He is fully alert and oriented with a normal mood and affect.  He is in no acute distress as a take the history.  He ambulates with a normal gait including tiptoe and heel walking.  Cutaneous examination of the spine reveals a small café au lait monisha in the right lower back.  There are no palpable bony defects of the spine.  There is no evidence of shortness of breath or respiratory distress.  There is no paravertebral muscle spasm or trochanteric tenderness.  There is mild right-sided sciatic notch sensitivity but none on the left.  Forward flexion of the spine is to 80 degrees causing some lower back discomfort.  His lower extremity neurological examination revealed 1-2+ symmetrical reflexes.  Motor power is normal to manual testing in all lower extremity groups and sensation is normal to light touch in all dermatomes.  Straight leg raising is negative to 90 degrees in the sitting position bilaterally.  His hips and his knees have a full and painless range of motion with normal stability.  Vascular examination shows no evidence of varicosities and there is no lymphedema.  There are no cutaneous abnormalities of the upper extremities or of the lower extremities. [de-identified] : In light of his complaints AP and lateral x-rays of the lumbar spine are obtained.  He has a minimal scoliosis.  He has very large batwing transverse processes at L5 attempting to fuse him to S1.  Sagittal alignment is normal and there are no destructive changes.

## 2022-08-10 NOTE — DISCUSSION/SUMMARY
[Medication Risks Reviewed] : Medication risks reviewed [de-identified] : He will rest and use moist heat.  As there is no paravertebral muscle spasm on the exam I doubt he has been to get any significant benefit from the cyclobenzaprine prescribed by his primary care physician.  He has been started on ibuprofen 800 mg 3 times a day as a nonsteroidal anti-inflammatory.  His primary care physician had told him he needs an MRI.  I see no indication for an MRI at this point.  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  At this point I see no indication for that.  I will see him for follow-up in 3 weeks.  He will call if there are problems with the medication or worsening of his symptoms.

## 2022-08-15 ENCOUNTER — APPOINTMENT (OUTPATIENT)
Dept: MRI IMAGING | Facility: IMAGING CENTER | Age: 42
End: 2022-08-15

## 2022-08-20 ENCOUNTER — APPOINTMENT (OUTPATIENT)
Dept: MRI IMAGING | Facility: IMAGING CENTER | Age: 42
End: 2022-08-20

## 2022-09-06 ENCOUNTER — RX RENEWAL (OUTPATIENT)
Age: 42
End: 2022-09-06

## 2022-12-08 ENCOUNTER — OUTPATIENT (OUTPATIENT)
Dept: OUTPATIENT SERVICES | Facility: HOSPITAL | Age: 42
LOS: 1 days | End: 2022-12-08
Payer: COMMERCIAL

## 2022-12-08 ENCOUNTER — APPOINTMENT (OUTPATIENT)
Dept: MRI IMAGING | Facility: IMAGING CENTER | Age: 42
End: 2022-12-08

## 2022-12-08 DIAGNOSIS — M54.16 RADICULOPATHY, LUMBAR REGION: ICD-10-CM

## 2022-12-08 PROCEDURE — 72148 MRI LUMBAR SPINE W/O DYE: CPT | Mod: 26

## 2022-12-08 PROCEDURE — 72148 MRI LUMBAR SPINE W/O DYE: CPT

## 2022-12-21 ENCOUNTER — APPOINTMENT (OUTPATIENT)
Dept: ORTHOPEDIC SURGERY | Facility: CLINIC | Age: 42
End: 2022-12-21

## 2022-12-21 DIAGNOSIS — M54.16 RADICULOPATHY, LUMBAR REGION: ICD-10-CM

## 2022-12-21 DIAGNOSIS — M51.36 OTHER INTERVERTEBRAL DISC DEGENERATION, LUMBAR REGION: ICD-10-CM

## 2022-12-21 DIAGNOSIS — M54.41 LUMBAGO WITH SCIATICA, RIGHT SIDE: ICD-10-CM

## 2022-12-21 DIAGNOSIS — Q76.49 OTHER CONGENITAL MALFORMATIONS OF SPINE, NOT ASSOCIATED WITH SCOLIOSIS: ICD-10-CM

## 2022-12-21 PROCEDURE — 99213 OFFICE O/P EST LOW 20 MIN: CPT

## 2022-12-21 PROCEDURE — 99072 ADDL SUPL MATRL&STAF TM PHE: CPT

## 2022-12-21 RX ORDER — IBUPROFEN 800 MG/1
800 TABLET, FILM COATED ORAL
Qty: 90 | Refills: 0 | Status: ACTIVE | COMMUNITY
Start: 2022-12-21 | End: 1900-01-01

## 2022-12-21 NOTE — DISCUSSION/SUMMARY
[Medication Risks Reviewed] : Medication risks reviewed [de-identified] : He has been placed back on the ibuprofen 800 mg 3 times a day.  I will see him for follow-up in 4 weeks.  When his symptoms have fully resolved and he is off the anti-inflammatory medication he will make another visit back to the physical therapist for instruction and back exercises to be done on a daily basis.  He will call if there are problems with the medication.

## 2022-12-21 NOTE — HISTORY OF PRESENT ILLNESS
[de-identified] : He returns for follow-up of his spine related symptoms.  He was seen in August with a history of some intermittent back symptoms worse after and on the job injury in July.  At that point he had back pain graded as a 5 and right leg pain graded as a 4.  X-rays revealed a transitional vertebra with sacralization of L5.  He was started on ibuprofen 800 mg 3 times a day and was to return for follow-up in 4 weeks.  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  The right leg pain resolved.  The back pain improved on the ibuprofen but has reoccurred.  He eventually had an MRI of the lumbar spine.

## 2022-12-21 NOTE — PHYSICAL EXAM
[de-identified] : He is comfortable sitting and straight leg raising is negative to 90 degrees bilaterally. [de-identified] : I reviewed his MRI of the lumbar spine with him.  He has a sacralization of L5.  There is some disc desiccation to a very mild degree at the L4-5 level but otherwise the MRI is normal.  His x-rays had revealed a minor scoliosis.

## 2025-08-27 ENCOUNTER — NON-APPOINTMENT (OUTPATIENT)
Age: 45
End: 2025-08-27

## 2025-08-27 ENCOUNTER — APPOINTMENT (OUTPATIENT)
Dept: NEUROLOGY | Facility: CLINIC | Age: 45
End: 2025-08-27
Payer: COMMERCIAL

## 2025-08-27 VITALS
WEIGHT: 186 LBS | SYSTOLIC BLOOD PRESSURE: 130 MMHG | DIASTOLIC BLOOD PRESSURE: 86 MMHG | OXYGEN SATURATION: 95 % | BODY MASS INDEX: 29.57 KG/M2 | HEART RATE: 101 BPM

## 2025-08-27 DIAGNOSIS — R42 DIZZINESS AND GIDDINESS: ICD-10-CM

## 2025-08-27 PROCEDURE — 99205 OFFICE O/P NEW HI 60 MIN: CPT

## 2025-09-16 ENCOUNTER — RESULT REVIEW (OUTPATIENT)
Age: 45
End: 2025-09-16

## 2025-09-17 ENCOUNTER — NON-APPOINTMENT (OUTPATIENT)
Age: 45
End: 2025-09-17